# Patient Record
Sex: MALE | Race: WHITE | NOT HISPANIC OR LATINO | Employment: UNEMPLOYED | URBAN - METROPOLITAN AREA
[De-identification: names, ages, dates, MRNs, and addresses within clinical notes are randomized per-mention and may not be internally consistent; named-entity substitution may affect disease eponyms.]

---

## 2019-09-16 ENCOUNTER — OFFICE VISIT (OUTPATIENT)
Dept: GASTROENTEROLOGY | Facility: CLINIC | Age: 64
End: 2019-09-16
Payer: MEDICARE

## 2019-09-16 ENCOUNTER — LAB VISIT (OUTPATIENT)
Dept: LAB | Facility: HOSPITAL | Age: 64
End: 2019-09-16
Attending: INTERNAL MEDICINE
Payer: MEDICARE

## 2019-09-16 VITALS
BODY MASS INDEX: 33.12 KG/M2 | DIASTOLIC BLOOD PRESSURE: 80 MMHG | WEIGHT: 211 LBS | SYSTOLIC BLOOD PRESSURE: 102 MMHG | HEART RATE: 98 BPM | HEIGHT: 67 IN

## 2019-09-16 DIAGNOSIS — K83.8 DILATION OF BILIARY TRACT: ICD-10-CM

## 2019-09-16 DIAGNOSIS — K75.89 CHOLESTATIC HEPATITIS: ICD-10-CM

## 2019-09-16 DIAGNOSIS — D37.6 NEOPLASM OF UNCERTAIN BEHAVIOR OF LIVER, GALLBLADDER, AND BILE DUCTS: ICD-10-CM

## 2019-09-16 DIAGNOSIS — L29.9 PRURITUS: ICD-10-CM

## 2019-09-16 DIAGNOSIS — K75.89 CHOLESTATIC HEPATITIS: Primary | ICD-10-CM

## 2019-09-16 LAB
ALBUMIN SERPL BCP-MCNC: 2.5 G/DL (ref 3.5–5.2)
ALP SERPL-CCNC: 1219 U/L (ref 55–135)
ALT SERPL W/O P-5'-P-CCNC: 503 U/L (ref 10–44)
ANION GAP SERPL CALC-SCNC: 11 MMOL/L (ref 8–16)
AST SERPL-CCNC: 316 U/L (ref 10–40)
BASOPHILS # BLD AUTO: 0.03 K/UL (ref 0–0.2)
BASOPHILS NFR BLD: 0.2 % (ref 0–1.9)
BILIRUB DIRECT SERPL-MCNC: >14 MG/DL (ref 0.1–0.3)
BILIRUB SERPL-MCNC: 30.6 MG/DL (ref 0.1–1)
BUN SERPL-MCNC: 14 MG/DL (ref 8–23)
CALCIUM SERPL-MCNC: 9.3 MG/DL (ref 8.7–10.5)
CANCER AG19-9 SERPL-ACNC: 1195 U/ML (ref 2–40)
CHLORIDE SERPL-SCNC: 98 MMOL/L (ref 95–110)
CO2 SERPL-SCNC: 23 MMOL/L (ref 23–29)
CREAT SERPL-MCNC: 1.2 MG/DL (ref 0.5–1.4)
DIFFERENTIAL METHOD: ABNORMAL
EOSINOPHIL # BLD AUTO: 0 K/UL (ref 0–0.5)
EOSINOPHIL NFR BLD: 0.1 % (ref 0–8)
ERYTHROCYTE [DISTWIDTH] IN BLOOD BY AUTOMATED COUNT: 17 % (ref 11.5–14.5)
EST. GFR  (AFRICAN AMERICAN): >60 ML/MIN/1.73 M^2
EST. GFR  (NON AFRICAN AMERICAN): >60 ML/MIN/1.73 M^2
GLUCOSE SERPL-MCNC: 191 MG/DL (ref 70–110)
HCT VFR BLD AUTO: 44.9 % (ref 40–54)
HGB BLD-MCNC: 14.7 G/DL (ref 14–18)
IMM GRANULOCYTES # BLD AUTO: 0.16 K/UL (ref 0–0.04)
IMM GRANULOCYTES NFR BLD AUTO: 1 % (ref 0–0.5)
INR PPP: 1 (ref 0.8–1.2)
LYMPHOCYTES # BLD AUTO: 0.6 K/UL (ref 1–4.8)
LYMPHOCYTES NFR BLD: 3.7 % (ref 18–48)
MCH RBC QN AUTO: 30.4 PG (ref 27–31)
MCHC RBC AUTO-ENTMCNC: 32.7 G/DL (ref 32–36)
MCV RBC AUTO: 93 FL (ref 82–98)
MONOCYTES # BLD AUTO: 1 K/UL (ref 0.3–1)
MONOCYTES NFR BLD: 6.2 % (ref 4–15)
NEUTROPHILS # BLD AUTO: 13.6 K/UL (ref 1.8–7.7)
NEUTROPHILS NFR BLD: 88.8 % (ref 38–73)
NRBC BLD-RTO: 0 /100 WBC
PLATELET # BLD AUTO: 402 K/UL (ref 150–350)
PMV BLD AUTO: 11.7 FL (ref 9.2–12.9)
POTASSIUM SERPL-SCNC: 4.5 MMOL/L (ref 3.5–5.1)
PROT SERPL-MCNC: 6.8 G/DL (ref 6–8.4)
PROTHROMBIN TIME: 11 SEC (ref 9–12.5)
RBC # BLD AUTO: 4.83 M/UL (ref 4.6–6.2)
SODIUM SERPL-SCNC: 132 MMOL/L (ref 136–145)
WBC # BLD AUTO: 15.27 K/UL (ref 3.9–12.7)

## 2019-09-16 PROCEDURE — 85610 PROTHROMBIN TIME: CPT

## 2019-09-16 PROCEDURE — 86790 VIRUS ANTIBODY NOS: CPT

## 2019-09-16 PROCEDURE — 99999 PR PBB SHADOW E&M-NEW PATIENT-LVL III: ICD-10-PCS | Mod: PBBFAC,,, | Performed by: INTERNAL MEDICINE

## 2019-09-16 PROCEDURE — 86706 HEP B SURFACE ANTIBODY: CPT

## 2019-09-16 PROCEDURE — 99999 PR PBB SHADOW E&M-NEW PATIENT-LVL III: CPT | Mod: PBBFAC,,, | Performed by: INTERNAL MEDICINE

## 2019-09-16 PROCEDURE — 1101F PT FALLS ASSESS-DOCD LE1/YR: CPT | Mod: CPTII,S$GLB,, | Performed by: INTERNAL MEDICINE

## 2019-09-16 PROCEDURE — 80076 HEPATIC FUNCTION PANEL: CPT

## 2019-09-16 PROCEDURE — 36415 COLL VENOUS BLD VENIPUNCTURE: CPT

## 2019-09-16 PROCEDURE — 1101F PR PT FALLS ASSESS DOC 0-1 FALLS W/OUT INJ PAST YR: ICD-10-PCS | Mod: CPTII,S$GLB,, | Performed by: INTERNAL MEDICINE

## 2019-09-16 PROCEDURE — 99205 PR OFFICE/OUTPT VISIT, NEW, LEVL V, 60-74 MIN: ICD-10-PCS | Mod: S$GLB,,, | Performed by: INTERNAL MEDICINE

## 2019-09-16 PROCEDURE — 3079F PR MOST RECENT DIASTOLIC BLOOD PRESSURE 80-89 MM HG: ICD-10-PCS | Mod: CPTII,S$GLB,, | Performed by: INTERNAL MEDICINE

## 2019-09-16 PROCEDURE — 3074F PR MOST RECENT SYSTOLIC BLOOD PRESSURE < 130 MM HG: ICD-10-PCS | Mod: CPTII,S$GLB,, | Performed by: INTERNAL MEDICINE

## 2019-09-16 PROCEDURE — 86301 IMMUNOASSAY TUMOR CA 19-9: CPT

## 2019-09-16 PROCEDURE — 80048 BASIC METABOLIC PNL TOTAL CA: CPT

## 2019-09-16 PROCEDURE — 85025 COMPLETE CBC W/AUTO DIFF WBC: CPT

## 2019-09-16 PROCEDURE — 3074F SYST BP LT 130 MM HG: CPT | Mod: CPTII,S$GLB,, | Performed by: INTERNAL MEDICINE

## 2019-09-16 PROCEDURE — 99205 OFFICE O/P NEW HI 60 MIN: CPT | Mod: S$GLB,,, | Performed by: INTERNAL MEDICINE

## 2019-09-16 PROCEDURE — 3079F DIAST BP 80-89 MM HG: CPT | Mod: CPTII,S$GLB,, | Performed by: INTERNAL MEDICINE

## 2019-09-16 RX ORDER — TRAMADOL HYDROCHLORIDE 50 MG/1
50 TABLET ORAL EVERY 6 HOURS PRN
Refills: 0 | COMMUNITY
Start: 2019-09-04

## 2019-09-16 RX ORDER — DICYCLOMINE HYDROCHLORIDE 10 MG/1
10 CAPSULE ORAL 2 TIMES DAILY
Refills: 0 | COMMUNITY
Start: 2019-09-04

## 2019-09-16 RX ORDER — PREDNISONE 5 MG/1
15 TABLET ORAL DAILY
Qty: 90 TABLET | Refills: 1 | Status: SHIPPED | OUTPATIENT
Start: 2019-09-16

## 2019-09-16 RX ORDER — HYDROXYZINE HYDROCHLORIDE 25 MG/1
25 TABLET, FILM COATED ORAL 3 TIMES DAILY PRN
Qty: 30 TABLET | Refills: 1 | Status: SHIPPED | OUTPATIENT
Start: 2019-09-16

## 2019-09-16 RX ORDER — ONDANSETRON 4 MG/1
4 TABLET, FILM COATED ORAL
Refills: 0 | COMMUNITY
Start: 2019-09-04

## 2019-09-16 RX ORDER — PREDNISONE 20 MG/1
20 TABLET ORAL DAILY
Refills: 0 | COMMUNITY
Start: 2019-09-04 | End: 2019-09-16 | Stop reason: DRUGHIGH

## 2019-09-16 NOTE — PROGRESS NOTES
Subjective:     Dilan Dukes III is here for evaluation of Jaundice      HPI  Dilan Dukes III is here for evaluation of worsening abnormal LFTs primarily cholestatic.  Patient evaluations while admitted that overall unremarkable.  He was started on prednisone empirically for drug-induced liver injury.  Initially his total bilirubin was around 9 alkaline phosphatase around 400 and in this reportedly increase to a bilirubin of around 17 and alkaline phosphatase of 900.  His AST and ALT have been in the 401 100 range respectively.  Labs have continued to worsen despite prednisone.    Previously was on minocycline then switch to possibly Keflex for a back staph infection, this was sometime in May (or prior). No other OTC med, he was only taking prescribed metformin and lipitor which he'd been on for years.  No history of heavy alcohol use and none in weeks.    He continues to have worsening jaundice.  Also reports the development of pruritus over the weekend.  He had a lot of problems after his EUS reporting significant abdominal discomfort which has taper down.  His main discomfort is on the right side of his abdomen in the upper right quadrant. He reports his pain was there prior to his biopsy.  He also occasionally have some discomfort to cost epigastric region that is mild.  He has altered bowel habits ever since his cholecystectomy.    Outside records reviewed    Acute hep panel negative, unremarkable MRCP, anti mitochondrial antibody negative, anti smooth muscle antibody negative,     Sept  CT scan of the abdomen without contrast showed remote appendectomy, sigmoid diverticulosis, remote cholecystectomy, no acute abnormalities.  Ultrasound showed increased echogenicity, no focal abnormalities, common bile duct nondilated at 5 mm.  MRCP showed no intrahepatic ductal dilatation common bile duct measures 1 cm.  No filling defects or masses.    Liver biopsy from September 4, 2019 shows steatohepatitis with  moderate steatosis, moderate activity and minimal patchy fibrosis.  Grade 2 inflammation, stage I fibrosis, moderate steatosis    Review of Systems   Constitutional: Positive for activity change, appetite change, fatigue and unexpected weight change (loss).   HENT: Negative.    Eyes: Negative.    Respiratory: Negative.    Cardiovascular: Negative.    Gastrointestinal: Negative.    Genitourinary: Negative.    Musculoskeletal: Negative.    Skin: Positive for color change.        pruritus   Neurological: Positive for weakness.   Psychiatric/Behavioral: Negative.        Objective:     Physical Exam   Constitutional: He is oriented to person, place, and time. He appears well-developed and well-nourished. No distress.   HENT:   Head: Normocephalic and atraumatic.   Mouth/Throat: Oropharynx is clear and moist. No oropharyngeal exudate.   Eyes: Pupils are equal, round, and reactive to light. Right eye exhibits no discharge. Left eye exhibits no discharge. Scleral icterus is present.   Pulmonary/Chest: Effort normal and breath sounds normal. No respiratory distress. He has no wheezes.   Abdominal: Soft. He exhibits no distension. There is tenderness (minimal RUQ and epigastric).   Musculoskeletal: He exhibits no edema.   Neurological: He is alert and oriented to person, place, and time.   Skin:   jaundiced   Psychiatric: He has a normal mood and affect. His behavior is normal.   Vitals reviewed.      Computed MELD-Na score unavailable. Necessary lab results were not found in the last year.  Computed MELD score unavailable. Necessary lab results were not found in the last year.    WBC   Date Value Ref Range Status   12/02/2014 15.04 (H) 3.90 - 12.70 K/uL Final     Hemoglobin   Date Value Ref Range Status   12/05/2014 10.3 (L) 14.0 - 18.0 g/dL Final     Hematocrit   Date Value Ref Range Status   12/05/2014 30.6 (L) 40.0 - 54.0 % Final     Platelets   Date Value Ref Range Status   12/02/2014 271 150 - 350 K/uL Final     BUN,  Bld   Date Value Ref Range Status   12/02/2014 12 8 - 23 mg/dL Final     Creatinine   Date Value Ref Range Status   12/02/2014 0.9 0.5 - 1.4 mg/dL Final     Glucose   Date Value Ref Range Status   12/02/2014 155 (H) 70 - 110 mg/dL Final     Calcium   Date Value Ref Range Status   12/02/2014 8.7 8.7 - 10.5 mg/dL Final     Sodium   Date Value Ref Range Status   12/02/2014 140 136 - 145 mmol/L Final     Potassium   Date Value Ref Range Status   12/02/2014 4.7 3.5 - 5.1 mmol/L Final     Chloride   Date Value Ref Range Status   12/02/2014 107 95 - 110 mmol/L Final     AST   Date Value Ref Range Status   12/02/2014 29 10 - 40 U/L Final     ALT   Date Value Ref Range Status   12/02/2014 43 10 - 44 U/L Final     Alkaline Phosphatase   Date Value Ref Range Status   12/02/2014 76 55 - 135 U/L Final     Total Bilirubin   Date Value Ref Range Status   12/02/2014 0.4 0.1 - 1.0 mg/dL Final     Comment:     For infants and newborns, interpretation of results should be based  on gestational age, weight and in agreement with clinical  observations.  Premature Infant recommended reference ranges:  Up to 24 hours.............<8.0 mg/dL  Up to 48 hours............<12.0 mg/dL  3-5 days..................<15.0 mg/dL  6-29 days.................<15.0 mg/dL       Albumin   Date Value Ref Range Status   12/02/2014 3.7 3.5 - 5.2 g/dL Final     No results found for: INR      Assessment/Plan:     1. Cholestatic hepatitis    2. 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 Pruritus      Dilan Dukes III is a 64 y.o. male withJaundice    Cholestatic hepatitis-unclear etiology.  I am highly concerned about a malignancy.  His biopsy does not seem to match his lab work.  It is unclear whether was no cholestasis seen on the biopsy although the bilirubin was high.  Would like to have biopsy reviewed here.  I do not believe the patient has had contrasted imaging therefore proceed with CT  scan with contrast.  Agree with plan for ERCP with spyglass for further evaluation of the biliary system as well as possible stent placement for management of severe jaundice.  Lower concern for drug-induced liver injury as there was no new medication that fits well with the time course of his presentation or worsening.  May have to consider repeat biopsy if needed.  -discussed with patient and wife that we need to evaluate for malignancy.  -     Basic metabolic panel; Future; Expected date: 09/16/2019  -     Hepatic function panel; Future; Expected date: 09/16/2019  -     CBC auto differential; Future; Expected date: 09/16/2019  -     Cancer antigen 19-9; Future; Expected date: 09/16/2019  -     Hepatitis A antibody, IgG; Future; Expected date: 09/16/2019  -     Hepatitis B surface antibody; Future; Expected date: 09/16/2019  -     Protime-INR; Future; Expected date: 09/16/2019  -     CT Abdomen With Without Contrast; Future; Expected date: 09/16/2019  -     predniSONE (DELTASONE) 5 MG tablet; Take 3 tablets (15 mg total) by mouth once daily. Taper by 5mg weekly  Dispense: 90 tablet; Refill: 1  -     Basic metabolic panel; Future; Expected date: 09/16/2019  -     Hepatic function panel; Future; Expected date: 09/16/2019  -     CBC auto differential; Future; Expected date: 09/16/2019  -     Protime-INR; Future; Expected date: 09/16/2019  -     Tissue Specimen To Pathology, Gastroenterology; Future; Expected date: 09/16/2019    Dilation of biliary tract  -     Cancer antigen 19-9; Future; Expected date: 09/16/2019  -     CT Abdomen With Without Contrast; Future; Expected date: 09/16/2019    Neoplasm of uncertain behavior of liver, gallbladder, and bile ducts   -     Cancer antigen 19-9; Future; Expected date: 09/16/2019  -     CT Abdomen With Without Contrast; Future; Expected date: 09/16/2019    Pruritus  -     hydrOXYzine HCl (ATARAX) 25 MG tablet; Take 1 tablet (25 mg total) by mouth 3 (three) times daily as  needed for Itching.  Dispense: 30 tablet; Refill: 1    Return to clinic in 4 weeks in the meantime will evaluate with CT scan and ERCP      Suze Quezada MD

## 2019-09-17 ENCOUNTER — TELEPHONE (OUTPATIENT)
Dept: NEPHROLOGY | Facility: CLINIC | Age: 64
End: 2019-09-17

## 2019-09-17 ENCOUNTER — HOSPITAL ENCOUNTER (OUTPATIENT)
Dept: RADIOLOGY | Facility: HOSPITAL | Age: 64
Discharge: HOME | End: 2019-09-17
Attending: INTERNAL MEDICINE
Payer: MEDICARE

## 2019-09-17 DIAGNOSIS — K75.89 CHOLESTATIC HEPATITIS: ICD-10-CM

## 2019-09-17 DIAGNOSIS — D37.6 NEOPLASM OF UNCERTAIN BEHAVIOR OF LIVER, GALLBLADDER, AND BILE DUCTS: ICD-10-CM

## 2019-09-17 DIAGNOSIS — K83.8 DILATION OF BILIARY TRACT: ICD-10-CM

## 2019-09-17 LAB
HBV SURFACE AB SER-ACNC: POSITIVE M[IU]/ML
HEPATITIS A ANTIBODY, IGG: NEGATIVE

## 2019-09-17 PROCEDURE — 74160 CT ABDOMEN WITH CONTRAST: ICD-10-PCS | Mod: 26,,, | Performed by: RADIOLOGY

## 2019-09-17 PROCEDURE — 25500020 PHARM REV CODE 255: Performed by: INTERNAL MEDICINE

## 2019-09-17 PROCEDURE — 74160 CT ABDOMEN W/CONTRAST: CPT | Mod: TC

## 2019-09-17 PROCEDURE — 74160 CT ABDOMEN W/CONTRAST: CPT | Mod: 26,,, | Performed by: RADIOLOGY

## 2019-09-17 RX ADMIN — IOHEXOL 100 ML: 350 INJECTION, SOLUTION INTRAVENOUS at 01:09

## 2019-09-17 NOTE — TELEPHONE ENCOUNTER
Called and spoke with patient. States that he is feeling weak and tired today. States that he had a bad night ate nachos with corn chips and could not sleep. Feels that what he ate did not agree with his stomach. Got up at 4:30am and had a bowel movement then felt a little nauseous. Main complaint is fatigue. Is scheduled for CT scan today. Advised that we are waiting on a few more lab results to come in. Will route to Dr. Quezada just as MINOO.

## 2019-09-17 NOTE — TELEPHONE ENCOUNTER
----- Message from Elba Larsen sent at 9/17/2019 10:01 AM CDT -----  Contact: ms rivera-wife  FYI: states that patient woke up not feeling well at all, please advise..[phone] (home)

## 2019-09-18 ENCOUNTER — TELEPHONE (OUTPATIENT)
Dept: GASTROENTEROLOGY | Facility: CLINIC | Age: 64
End: 2019-09-18

## 2019-09-18 ENCOUNTER — TELEPHONE (OUTPATIENT)
Dept: TRANSPLANT | Facility: CLINIC | Age: 64
End: 2019-09-18

## 2019-09-18 DIAGNOSIS — Z23 NEED FOR HEPATITIS A VACCINATION: Primary | ICD-10-CM

## 2019-09-18 NOTE — TELEPHONE ENCOUNTER
Reports that everything taken care of when I returned call    ----- Message from Laura Smith sent at 9/18/2019 11:19 AM CDT -----  Contact: Wife- Mrs DukesNiwzrzed-[phone]  Would like to consult with the nurse, Daughter is their to  the Patient image, Wife states that' they do not want to released them, Wife would like to speak with the nurse concerning this, Please call back at [phone], Thanks sj

## 2019-09-18 NOTE — TELEPHONE ENCOUNTER
Spoke with the patient as well as his referring physicians including the person doing his ERCP today about the lesion seen in the head of the pancreas.  Also about the elevated CA 19 9 and his recent labs.  Discuss concern for pancreatic cancer.  First step is to get ERCP.  Dr. Mckeon will take over his care given no intrinsic liver disease.  Highly concern for pancreatic cancer.  She will get him set up with PET scan additional follow-up.  He is hepatitis-B immune but needs hepatitis a vaccination.

## 2019-09-19 ENCOUNTER — TELEPHONE (OUTPATIENT)
Dept: GASTROENTEROLOGY | Facility: CLINIC | Age: 64
End: 2019-09-19

## 2019-09-19 NOTE — TELEPHONE ENCOUNTER
Please schedule patient for hepatitis a vaccination.  He does not need additional follow-up with me.  Please cancel that follow-up appointment.  Please send copies of my note, this telephone note and recent labs to patient's primary care doctor.   Dr Quezada

## 2019-09-19 NOTE — TELEPHONE ENCOUNTER
msg left for pt to call back.   Follow up appt with Dr Quezada cancelled.  Faxed a copy to pts PCP, Dr Cielo Tellez.

## 2019-09-19 NOTE — TELEPHONE ENCOUNTER
Patient phoned reports that he was hospitalized at [facility] and had stent placed, Dr Noriega recommended that patient get off steroids more rapidly than the taper Dr Quezada recommended although patient did not have any instructions from Dr Noriega regarding the specifics of her recommendations regarding the taper. Instructed patient to contact that physician to see if he could get more info. He would also like to report that he is having surgery by Dr Almendarez on the 27th on pancreas and wants Dr Quezada aware.

## 2019-11-22 ENCOUNTER — TELEPHONE (OUTPATIENT)
Dept: GASTROENTEROLOGY | Facility: CLINIC | Age: 64
End: 2019-11-22

## 2019-11-22 NOTE — TELEPHONE ENCOUNTER
Spoke with patient wife who is requesting copy of patient medical records. Transferred patient to medical records for release of patient health record at [phone].

## 2019-11-22 NOTE — TELEPHONE ENCOUNTER
----- Message from Dominique Pappas sent at 11/22/2019  1:55 PM CST -----  Contact: wife   She's calling in regard to  Copy of report        pls call pt back at [phone]

## 2019-11-25 ENCOUNTER — TELEPHONE (OUTPATIENT)
Dept: GASTROENTEROLOGY | Facility: CLINIC | Age: 64
End: 2019-11-25

## 2019-11-25 NOTE — TELEPHONE ENCOUNTER
Spoke with patient and his wife Aruna. They will come by office on tomorrow to obtain a copy of CT scan report.

## 2019-11-25 NOTE — TELEPHONE ENCOUNTER
----- Message from Valeria Haile sent at 11/25/2019  3:30 PM CST -----  Contact: Aruna- spouse  Pt was in around noon today, requesting records for procedure pt had on 09/18 or 09/19. Pt had a scope with CT and contrast that was ordered by Dr. Quezada. Pt's spouse is requesting copy for insurance purposes. Please call Aruna back at [phone]. Aruna spoke with [facility] around noon today. Aruna states this is the 3rd request and she's needing info as soon as possible.

## 2021-04-28 ENCOUNTER — PATIENT MESSAGE (OUTPATIENT)
Dept: RESEARCH | Facility: HOSPITAL | Age: 66
End: 2021-04-28

## 2021-07-01 ENCOUNTER — PATIENT MESSAGE (OUTPATIENT)
Dept: ADMINISTRATIVE | Facility: OTHER | Age: 66
End: 2021-07-01
